# Patient Record
Sex: FEMALE
[De-identification: names, ages, dates, MRNs, and addresses within clinical notes are randomized per-mention and may not be internally consistent; named-entity substitution may affect disease eponyms.]

---

## 2017-12-04 ENCOUNTER — LAB SERVICES (OUTPATIENT)
Dept: OTHER | Age: 32
End: 2017-12-04

## 2017-12-04 ENCOUNTER — CHARTING TRANS (OUTPATIENT)
Dept: OTHER | Age: 32
End: 2017-12-04

## 2017-12-04 LAB
APPEARANCE: YELLOW
BILIRUBIN: NORMAL
GLUCOSE U: NORMAL
KETONES: NORMAL
LEUKOCYTES: 2
NITRITE: NORMAL
OCCULT BLOOD: 3
PH: 6
PROTEIN: NORMAL
URINE SPEC GRAVITY: 1.02
UROBILINOGEN: 0.2

## 2017-12-04 ASSESSMENT — PAIN SCALES - GENERAL: PAINLEVEL_OUTOF10: 5

## 2018-11-02 VITALS
TEMPERATURE: 99 F | DIASTOLIC BLOOD PRESSURE: 60 MMHG | SYSTOLIC BLOOD PRESSURE: 90 MMHG | HEART RATE: 80 BPM | RESPIRATION RATE: 16 BRPM

## 2019-08-21 NOTE — ED INITIAL ASSESSMENT (HPI)
Pt presents to ed ambulatory with steady gait c/o anxiety.  Pt states she has initial appointment with psych Tuesday next week, was prescribed 1mg lorazepam BID by a \"clinic\" but has run out of medication and is requesting a refill to last until appointme

## 2019-08-21 NOTE — ED PROVIDER NOTES
Patient Seen in: [facility] Emergency Department    History   Patient presents with:   Anxiety/Panic attack (neurologic)    Stated Complaint: anxiety symptoms, appointment with psychiatrist in one week, out of meds, denie*    HPI    52-year-old woman (primary encounter diagnosis)    Disposition:  Discharge  8/20/2019  7:58 pm    Follow-up:  [facility]  [address] [address] [phone]    or your assigned psychiatrist        Medications Prescribed:  Current Discha

## 2019-09-04 ENCOUNTER — HOSPITAL (OUTPATIENT)
Dept: OTHER | Age: 34
End: 2019-09-04
Attending: EMERGENCY MEDICINE

## (undated) NOTE — ED AVS SNAPSHOT
Fe Gordon   MRN: [id number]    Department:  [facility] Emergency Department   Date of Visit:  8/20/2019           Disclosure     Insurance plans vary and the physician(s) referred by the ER may not be covered by your plan.  Please contact you tell this physician (or your personal doctor if your instructions are to return to your personal doctor) about any new or lasting problems. The primary care or specialist physician will see patients referred from the [facility] Emergency Department.  Wing Devine